# Patient Record
Sex: MALE | Race: OTHER | Employment: OTHER | URBAN - METROPOLITAN AREA
[De-identification: names, ages, dates, MRNs, and addresses within clinical notes are randomized per-mention and may not be internally consistent; named-entity substitution may affect disease eponyms.]

---

## 2018-12-21 ENCOUNTER — NEW PATIENT COMPREHENSIVE (OUTPATIENT)
Dept: URBAN - METROPOLITAN AREA CLINIC 39 | Facility: CLINIC | Age: 63
End: 2018-12-21

## 2018-12-21 DIAGNOSIS — H52.202: ICD-10-CM

## 2018-12-21 DIAGNOSIS — H52.02: ICD-10-CM

## 2018-12-21 DIAGNOSIS — H52.4: ICD-10-CM

## 2018-12-21 PROCEDURE — 92015 DETERMINE REFRACTIVE STATE: CPT

## 2018-12-21 PROCEDURE — 92004 COMPRE OPH EXAM NEW PT 1/>: CPT

## 2018-12-21 ASSESSMENT — TONOMETRY
OD_IOP_MMHG: 13
OS_IOP_MMHG: 13

## 2018-12-21 ASSESSMENT — VISUAL ACUITY
OS_SC: J3
OD_SC: 20/25
OD_BAT: 20/40
OS_SC: 20/20-1
OS_BAT: <20/400 W/ MR
OD_SC: J3

## 2019-12-20 ENCOUNTER — PREPPED CHART (OUTPATIENT)
Dept: URBAN - METROPOLITAN AREA CLINIC 39 | Facility: CLINIC | Age: 64
End: 2019-12-20

## 2019-12-20 DIAGNOSIS — H52.4: ICD-10-CM

## 2019-12-20 DIAGNOSIS — H52.202: ICD-10-CM

## 2019-12-20 DIAGNOSIS — H52.02: ICD-10-CM

## 2019-12-20 PROCEDURE — 92014 COMPRE OPH EXAM EST PT 1/>: CPT

## 2019-12-20 PROCEDURE — 92015 DETERMINE REFRACTIVE STATE: CPT

## 2021-04-12 NOTE — PATIENT DISCUSSION
POAG, OU:  ELEVATED INTRAOCULAR PRESSURE. PRESCRIBE __LATANOPROST__. RETURN FOR FOLLOW-UP AS SCHEDULED.

## 2022-01-04 NOTE — PATIENT DISCUSSION
POAG, OU Counseling: I have reviewed the regimen of glaucoma drops with the patient and have stressed the importance of compliance. Patient instructed to continue present medication and return for follow-up as scheduled. Hide Additional Notes?: No Detail Level: Zone Detail Level: Simple